# Patient Record
Sex: FEMALE | Race: ASIAN | URBAN - METROPOLITAN AREA
[De-identification: names, ages, dates, MRNs, and addresses within clinical notes are randomized per-mention and may not be internally consistent; named-entity substitution may affect disease eponyms.]

---

## 2020-05-26 ENCOUNTER — OFFICE VISIT (OUTPATIENT)
Dept: PRIMARY CARE CLINIC | Age: 20
End: 2020-05-26
Payer: COMMERCIAL

## 2020-05-26 VITALS — HEART RATE: 88 BPM | OXYGEN SATURATION: 99 % | TEMPERATURE: 98.8 F

## 2020-05-26 PROCEDURE — 99213 OFFICE O/P EST LOW 20 MIN: CPT | Performed by: NURSE PRACTITIONER

## 2020-05-26 NOTE — PROGRESS NOTES
2020  Jasen Chris (:  2000)      EMPLOYER / Occupation- Not employed.         DO YOU NEED A WORK NOTE: [] Yes  [x] No       FLU/COVID-19 CLINIC EVALUATION    HPI:  SYMPTOMS:  Primary Language: [] English   [] [place]  [] Divehi  []   Allergies:__nkda_______    SOCIAL HISTORY:  Number of people living in patient's home (counting the patient as 1):    [] 1   [] 2   [] 3   [x] 4   [] 5   [] >6    Symptom duration, days:  [] 1   [x] 2   [] 3   [] 4 - 7   [] 8 - 10   [] 11 - 13   [] >14    [] ASYMPTOMATIC  [] Fevers    [] Symptom (not measured)  [] Measured (Result:  degrees)  [] Chills  [] Cough [] Dry [] Productive   []Loss of Taste  [] Loss of Smell  []Decreased Appetite  [] Coughing up blood  }  [] Chest Congestion  [x] Nasal Congestion  [] Runny  Nose  [] Sneezing  [] Feeling short of breath   []Sometimes    [] Frequently    [] All the time     [] Chest pain     [x] Headaches  [x]Tolerable  [] Severe     [] Fatigue  [] Sore throat  [] Muscle aches  [] Nausea  [] Vomiting  []Unable to keep fluids down     [] Diarrhea  []Severe       [] OTHER SYMPTOMS:    Symptom course:   [] Worsening     [x] Stable     [] Improving    RISK FACTORS:1}  [] Pregnant or possibly pregnant  [] Age over 61  [] Diabetes  [] HTN  [] Heart disease  [] Asthma  [] COPD/Other chronic lung diseases  [] Active Cancer  [] On Chemotherapy  [] Taking oral steroids  [] History Lymphoma/Leukemia  [x] Close contact with a lab confirmed COVID-19 patient within 14 days of symptom onset  [] History of travel from affected geographical areas within 14 days of symptom onset     PHYSICAL EXAMINATION:    Vitals:    20 1156   Pulse: 88   Temp: 98.8 °F (37.1 °C)   SpO2: 99%        INSTRUCTIONS:  \"[x]\" Indicates a positive item  \"[]\" Indicates a negative item    DELETE ALL ITEMS NOT EXAMINED    [x] Alert  [x] Oriented to person/place/time    [x] No apparent distress  [] Toxic appearing    [] Face flushed appearing [] Sclera clear  [] Lips are

## 2020-05-29 LAB
SARS-COV-2: NOT DETECTED
SOURCE: NORMAL

## 2025-04-27 ENCOUNTER — OFFICE VISIT (OUTPATIENT)
Age: 25
End: 2025-04-27

## 2025-04-27 VITALS
HEIGHT: 66 IN | SYSTOLIC BLOOD PRESSURE: 106 MMHG | BODY MASS INDEX: 28.93 KG/M2 | DIASTOLIC BLOOD PRESSURE: 74 MMHG | OXYGEN SATURATION: 99 % | HEART RATE: 78 BPM | TEMPERATURE: 97.8 F | WEIGHT: 180 LBS

## 2025-04-27 DIAGNOSIS — H57.12 EYE PAIN, LEFT: Primary | ICD-10-CM

## 2025-04-27 RX ORDER — TOBRAMYCIN AND DEXAMETHASONE 3; 1 MG/ML; MG/ML
1 SUSPENSION/ DROPS OPHTHALMIC
Qty: 5 ML | Refills: 0 | Status: SHIPPED | OUTPATIENT
Start: 2025-04-27 | End: 2025-05-02